# Patient Record
(demographics unavailable — no encounter records)

---

## 2025-04-28 NOTE — SOCIAL HISTORY
[House] : [unfilled] lives in a house  [Radiator/Baseboard] : heating provided by radiator(s)/baseboard(s) [Dog] : dog [Cat] : cat

## 2025-04-28 NOTE — PHYSICAL EXAM
[Alert] : alert [Well Nourished] : well nourished [No Acute Distress] : no acute distress [Well Developed] : well developed [Sclera Not Icteric] : sclera not icteric [Normal TMs] : both tympanic membranes were normal [No Neck Mass] : no neck mass was observed [Normal Rate and Effort] : normal respiratory rhythm and effort [No Crackles] : no crackles [Normal Rate] : heart rate was normal  [Normal S1, S2] : normal S1 and S2 [No murmur] : no murmur [Regular Rhythm] : with a regular rhythm [Not Distended] : not distended [Normal Cervical Lymph Nodes] : cervical [Skin Intact] : skin intact  [No Joint Swelling or Erythema] : no joint swelling or erythema [Normal Mood] : mood was normal

## 2025-05-06 NOTE — REVIEW OF SYSTEMS
[Rhinorrhea] : rhinorrhea [Nasal Congestion] : nasal congestion [Urticaria] : urticaria [Nl] : Integumentary [Fatigue] : no fatigue [Fever] : no fever [Eye Discharge] : no eye discharge [Eye Redness] : no redness [Puffy Eyelids] : no puffy ~T eyelids [Bloodshot Eyes] : no bloodshot ~T eyes [Post Nasal Drip] : no post nasal drip

## 2025-05-06 NOTE — END OF VISIT
[FreeTextEntry3] : I, Dr. Miryam Hernández, personally performed the evaluation and management (E/M) services for this established patient who presents today with (a) new problem(s)/exacerbation of (an) existing condition(s).  That E/M includes conducting the examination, assessing all new/exacerbated conditions, and establishing a new plan of care.  Today, my CHANEL, O'ol Blue, was here to observe my evaluation and management services for this new problem/exacerbated condition to be followed going forward.

## 2025-05-06 NOTE — PLAN
[FreeTextEntry1] :  AMOXICILLIN CHALLENGE: Today the patient, took 500 mg of Amoxicillin in two divided doses( 10 percent and ninety percent) in the office and she was observed for 1 hour. No adverse reactions noted. Therefore, she  is not considered allergic to Penicillin/Amoxicillin  any longer. She can be prescribed penicillin antibiotics as indicated. PENICILLIN IS REMOVED FROM his ALLERGY LIST.

## 2025-05-06 NOTE — HISTORY OF PRESENT ILLNESS
[Consent obtained and signed form scanned in to chart] : Consent obtained and signed form scanned in to chart [] : The following medications are to be available during the challenge procedure: [Diphenhydramine] : Diphenhydramine, 1-2mg/kg IM (max dose 50mg), (50mg/1 cc) [___ mg] : Dose: [unfilled] mg [___ cc] : Volume: [unfilled] cc [Epinephrine 1:1000 IM] : Epinephrine 1:1000 IM, 0.01cc/kg (max dose 0.5 cc) [Albuterol MDI] : Albuterol MDI, 2 - 4 puffs [Albuterol nebulized] : Albuterol nebulized, 0.083% [_______] : Time: [unfilled] [___] : Time: [unfilled] [Clear] : Skin Findings: Clear [No] : Reaction: No [Antihistamine use in past 5 days] : No antihistamine use in past 5 days [Recent Illness] : no recent illness [Fever] : no fever [de-identified] : This is a 55-year-old female, with allergic rhinitis to dot, cat, tree and grass and amoxicillin allergy here for amoxicillin challenge.   For the past one year, she has had recurrent pruritic rash and monthly swollen lips. Also admits to linear rash with scratching and pressure. The rash subsides with oral antihistamine. Unsure about the trigger. Denies any associated shortness of breath.  She has noticed sometimes rash starts when she wakes up from a nap. Believes rash is possibly associated with her detergent. Last month, she stopped using the detergent without improvement, of the rash.  Admits to daily rhinorrhea. No sneezing or postnasal drip. Has one dog and a cat home.  Penicillin allergy: -15 years ago, she was placed on amoxicillin, unsure for what infection. Few hours later, she developed erythematous rash on her Upper extremity,  Photos of the rash reviewed: -Linear rash consistent with dermographism -Hives.   No history or symptoms of asthma, eczematous rashes, venom reactions.  Amoxicillin  [facility] - 18045-548-37 LOT # - [id number] EXP - 04/2027 [FreeTextEntry1] : Amoxicillin  [FreeTextEntry2] : 500mg [FreeTextEntry3] : BP: 130/84; HR: 63; SPO2: 98% [FreeTextEntry4] : BP: 113/76; HR: 62; SPO2: 96% [FreeTextEntry5] : Tolerated well. No concerns. [FreeTextEntry6] : No concerns.  [FreeTextEntry7] : BP: 109/74; HR: 62; SPO2: 96%

## 2025-05-06 NOTE — PHYSICAL EXAM
[Alert] : alert [Well Nourished] : well nourished [No Acute Distress] : no acute distress [Well Developed] : well developed [Sclera Not Icteric] : sclera not icteric [Normal TMs] : both tympanic membranes were normal [Boggy Nasal Turbinates] : boggy and/or pale nasal turbinates [Normal Rate and Effort] : normal respiratory rhythm and effort [Bilateral Audible Breath Sounds] : bilateral audible breath sounds [Normal Rate] : heart rate was normal  [Normal S1, S2] : normal S1 and S2 [No murmur] : no murmur [Regular Rhythm] : with a regular rhythm [Skin Intact] : skin intact  [Normal Mood] : mood was normal [Conjunctival Erythema] : no conjunctival erythema [Pharyngeal erythema] : no pharyngeal erythema [Clear Rhinorrhea] : no clear rhinorrhea was seen [Exudate] : no exudate [Wheezing] : no wheezing was heard [Patches] : no patches